# Patient Record
(demographics unavailable — no encounter records)

---

## 2024-11-14 NOTE — HISTORY OF PRESENT ILLNESS
[de-identified] : Patient is a 43-year-old male here for evaluation of his left arm.  He has been complaining of pain going from his neck down his left arm for the last 2 months.  The pain is vague.  He is having difficulty sleeping.  He went to his primary care physician who gave him a muscle relaxer which made him nauseous so he stopped taking it.  Takes Advil as needed.

## 2024-11-14 NOTE — DATA REVIEWED
[FreeTextEntry1] : 2 x-ray views taken in the office today of his neck shows straightening of the normal without a curve indicating muscle spasms.

## 2024-11-14 NOTE — PHYSICAL EXAM
[de-identified] :   Physical exam of his neck and left arm: Negative midline tenderness of the neck.  Positive paraspinal muscle tenderness left compared to the right.  Positive trapezius tenderness left compared to the right.  Full range of motion of his neck with pain elicited in the trapezius muscles.  Full range of motion of the shoulder.  Nontender over the midshaft of the humerus, elbow, or forearm.

## 2024-12-16 NOTE — REVIEW OF SYSTEMS
Detail Level: Detailed [Arthralgia] : arthralgia [Negative] : Constitutional [de-identified] : L V digit parasthesias

## 2024-12-16 NOTE — HISTORY OF PRESENT ILLNESS
[de-identified] : ORIGINAL PRESENTATION: Patient is a 43-year-old male here for evaluation of his left arm. He has been complaining of pain going from his neck down his left arm for the last 2 months. The pain is vague. He is having difficulty sleeping. He went to his primary care physician who gave him a muscle relaxer which made him nauseous so he stopped taking it. Takes Advil as needed.  TODAY: I had the pleasure of seeing Mr. Solis today in follow-up.  His previous history physical exam been reviewed.  He was initially seen in the walk-in for evaluation of numbness and tingling in his left pinky that had been ongoing for at this point 3 months. He was given a prescription to attend physical therapy which she states was helpful however once he stopped the therapy the numbness continues.  He denies any neck pain or loss of motion but does feel it radiates down from his neck into his fifth digit.  He has been using anti-inflammatory medication in conjunction with the physical therapy however stopped taking it stating it was bothering his stomach.  I will therefore evaluate him further at this time.

## 2024-12-16 NOTE — IMAGING
[de-identified] : Cervical spine: 5 out of 5 strength upper extremities, 2+ reflexes, full range of motion the cervical spine, slight decreased sensation in the left fifth digit.

## 2024-12-16 NOTE — ASSESSMENT
[FreeTextEntry1] : 43-year-old male with left-sided cervical radiculopathy.  I have requested authorization for an MRI of the cervical spine for further assessment and he will continue to attend physical therapy in the interim.  He will follow-up once MRI is completed to see Dr. Sanders to review the results and discuss next steps.  He is aware of this any ischial contact the office and he verbalized understanding and agreement.

## 2024-12-30 NOTE — IMAGING
[de-identified] : TTP midline cervical spine and paraspinal musculature   Strength                                                                     Deltoid   Right: 5/5; Left: 5/5                      Biceps   Right: 5/5; Left: 5/5                   Triceps        Right: 5/5; Left: 5/5                                 Wrist Extensors     Right: 5/5; Left: 5/5 Finger Flexors     Right: 5/5; Left: 5/5 IO    Right: 5/5; Left: 5/5  Sensation C5   Right: 2/2; Left: 2/2 C6   Right: 2/2; Left: 2/2 C7   Right: 2/2; Left: 2/2 C8   Right: 2/2; Left: 2/2 T1   Right: 2/2; Left: 2/2  Reflexes Biceps   Right: 2+; Left 2+ Triceps   Right: 2+; Left 2+ Reed's  Right: Negative; L: Negative

## 2024-12-30 NOTE — DISCUSSION/SUMMARY
[de-identified] : 43-year-old male presents to me with cervical radiculopathy resolving physical therapy.  No intervention continue physical therapy only if in pain.  We discussed treatment options including injections and surgery as last resort if no improvement in symptoms.

## 2024-12-30 NOTE — DATA REVIEWED
[FreeTextEntry1] : I reviewed the patient's MRI of his cervical spine he has foraminal disc herniation left side C6-7 with some smaller disc herniation C7-T1.

## 2024-12-30 NOTE — HISTORY OF PRESENT ILLNESS
[de-identified] : 43-year-old male presents with resolving left arm cervical radiculopathy.  He got an MRI of the cervical spine done he is here for review.  He has had symptoms off and on for a long time but however over the last couple months his symptoms intensified he started doing physical therapy and taking meloxicam and he significantly improved.